# Patient Record
Sex: MALE | Race: WHITE | NOT HISPANIC OR LATINO | URBAN - METROPOLITAN AREA
[De-identification: names, ages, dates, MRNs, and addresses within clinical notes are randomized per-mention and may not be internally consistent; named-entity substitution may affect disease eponyms.]

---

## 2018-12-17 ENCOUNTER — EMERGENCY (EMERGENCY)
Facility: HOSPITAL | Age: 83
LOS: 1 days | Discharge: HOME | End: 2018-12-17
Attending: EMERGENCY MEDICINE
Payer: MEDICARE

## 2018-12-17 VITALS
RESPIRATION RATE: 16 BRPM | SYSTOLIC BLOOD PRESSURE: 158 MMHG | TEMPERATURE: 98 F | HEART RATE: 69 BPM | WEIGHT: 124.56 LBS | OXYGEN SATURATION: 100 % | DIASTOLIC BLOOD PRESSURE: 77 MMHG

## 2018-12-17 LAB
ALBUMIN SERPL ELPH-MCNC: 3.7 G/DL — SIGNIFICANT CHANGE UP (ref 3.3–5.2)
ALP SERPL-CCNC: 58 U/L — SIGNIFICANT CHANGE UP (ref 40–120)
ALT FLD-CCNC: 14 U/L — SIGNIFICANT CHANGE UP
ANION GAP SERPL CALC-SCNC: 15 MMOL/L — SIGNIFICANT CHANGE UP (ref 5–17)
APPEARANCE UR: CLEAR — SIGNIFICANT CHANGE UP
AST SERPL-CCNC: 32 U/L — SIGNIFICANT CHANGE UP
BASOPHILS # BLD AUTO: 0 K/UL — SIGNIFICANT CHANGE UP (ref 0–0.2)
BASOPHILS NFR BLD AUTO: 0.5 % — SIGNIFICANT CHANGE UP (ref 0–2)
BILIRUB SERPL-MCNC: 0.3 MG/DL — LOW (ref 0.4–2)
BILIRUB UR-MCNC: NEGATIVE — SIGNIFICANT CHANGE UP
BUN SERPL-MCNC: 35 MG/DL — HIGH (ref 8–20)
CALCIUM SERPL-MCNC: 8.6 MG/DL — SIGNIFICANT CHANGE UP (ref 8.6–10.2)
CHLORIDE SERPL-SCNC: 103 MMOL/L — SIGNIFICANT CHANGE UP (ref 98–107)
CO2 SERPL-SCNC: 15 MMOL/L — LOW (ref 22–29)
COLOR SPEC: YELLOW — SIGNIFICANT CHANGE UP
CREAT SERPL-MCNC: 1.37 MG/DL — HIGH (ref 0.5–1.3)
DIFF PNL FLD: ABNORMAL
EOSINOPHIL # BLD AUTO: 0.2 K/UL — SIGNIFICANT CHANGE UP (ref 0–0.5)
EOSINOPHIL NFR BLD AUTO: 2.4 % — SIGNIFICANT CHANGE UP (ref 0–5)
EPI CELLS # UR: SIGNIFICANT CHANGE UP
GLUCOSE SERPL-MCNC: 78 MG/DL — SIGNIFICANT CHANGE UP (ref 70–115)
GLUCOSE UR QL: NEGATIVE MG/DL — SIGNIFICANT CHANGE UP
HCT VFR BLD CALC: 43 % — SIGNIFICANT CHANGE UP (ref 42–52)
HGB BLD-MCNC: 13.8 G/DL — LOW (ref 14–18)
KETONES UR-MCNC: NEGATIVE — SIGNIFICANT CHANGE UP
LEUKOCYTE ESTERASE UR-ACNC: ABNORMAL
LYMPHOCYTES # BLD AUTO: 1.2 K/UL — SIGNIFICANT CHANGE UP (ref 1–4.8)
LYMPHOCYTES # BLD AUTO: 18.2 % — LOW (ref 20–55)
MCHC RBC-ENTMCNC: 29.7 PG — SIGNIFICANT CHANGE UP (ref 27–31)
MCHC RBC-ENTMCNC: 32.1 G/DL — SIGNIFICANT CHANGE UP (ref 32–36)
MCV RBC AUTO: 92.5 FL — SIGNIFICANT CHANGE UP (ref 80–94)
MONOCYTES # BLD AUTO: 0.6 K/UL — SIGNIFICANT CHANGE UP (ref 0–0.8)
MONOCYTES NFR BLD AUTO: 8.5 % — SIGNIFICANT CHANGE UP (ref 3–10)
NEUTROPHILS # BLD AUTO: 4.6 K/UL — SIGNIFICANT CHANGE UP (ref 1.8–8)
NEUTROPHILS NFR BLD AUTO: 70.2 % — SIGNIFICANT CHANGE UP (ref 37–73)
NITRITE UR-MCNC: NEGATIVE — SIGNIFICANT CHANGE UP
PH UR: 5 — SIGNIFICANT CHANGE UP (ref 5–8)
PLATELET # BLD AUTO: 167 K/UL — SIGNIFICANT CHANGE UP (ref 150–400)
POTASSIUM SERPL-MCNC: 5.6 MMOL/L — HIGH (ref 3.5–5.3)
POTASSIUM SERPL-SCNC: 5.6 MMOL/L — HIGH (ref 3.5–5.3)
PROT SERPL-MCNC: 7 G/DL — SIGNIFICANT CHANGE UP (ref 6.6–8.7)
PROT UR-MCNC: 30 MG/DL
RBC # BLD: 4.65 M/UL — SIGNIFICANT CHANGE UP (ref 4.6–6.2)
RBC # FLD: 14.3 % — SIGNIFICANT CHANGE UP (ref 11–15.6)
RBC CASTS # UR COMP ASSIST: ABNORMAL /HPF (ref 0–4)
SODIUM SERPL-SCNC: 133 MMOL/L — LOW (ref 135–145)
SP GR SPEC: 1.01 — SIGNIFICANT CHANGE UP (ref 1.01–1.02)
UROBILINOGEN FLD QL: NEGATIVE MG/DL — SIGNIFICANT CHANGE UP
WBC # BLD: 6.6 K/UL — SIGNIFICANT CHANGE UP (ref 4.8–10.8)
WBC # FLD AUTO: 6.6 K/UL — SIGNIFICANT CHANGE UP (ref 4.8–10.8)
WBC UR QL: SIGNIFICANT CHANGE UP

## 2018-12-17 PROCEDURE — 80053 COMPREHEN METABOLIC PANEL: CPT

## 2018-12-17 PROCEDURE — 87186 SC STD MICRODIL/AGAR DIL: CPT

## 2018-12-17 PROCEDURE — 85027 COMPLETE CBC AUTOMATED: CPT

## 2018-12-17 PROCEDURE — 51702 INSERT TEMP BLADDER CATH: CPT

## 2018-12-17 PROCEDURE — 99284 EMERGENCY DEPT VISIT MOD MDM: CPT | Mod: 25

## 2018-12-17 PROCEDURE — 36415 COLL VENOUS BLD VENIPUNCTURE: CPT

## 2018-12-17 PROCEDURE — 87086 URINE CULTURE/COLONY COUNT: CPT

## 2018-12-17 PROCEDURE — 81001 URINALYSIS AUTO W/SCOPE: CPT

## 2018-12-17 NOTE — ED ADULT TRIAGE NOTE - CHIEF COMPLAINT QUOTE
"I haven't been able to urinate since 0900 this morning." Patient A&Ox4 complaining of discomfort to bladder area. Patient straight caths self, "did it at 9am, no urine came out."

## 2018-12-17 NOTE — ED STATDOCS - OBJECTIVE STATEMENT
84 y/o M pt with hx of prostate surgery presents to ED c/o urinary retention. Last urination was 9:00am this morning with a urinary catheter that he has at home to use daily if needed. Pt uses catheter daily. Pt tried to use catheter again last today and nothing came out. He states he feels a fullness in abdomen but denies pain. Pt takes medication after each meal for urinary issues. Pt has hx of UTIs.

## 2018-12-17 NOTE — ED STATDOCS - PROGRESS NOTE DETAILS
Pt. states that he feels better. Leg bag will be placed. Pt. will follow up with a urologist. labs results discussed with patient and his daughters.

## 2018-12-18 ENCOUNTER — EMERGENCY (EMERGENCY)
Facility: HOSPITAL | Age: 83
LOS: 1 days | Discharge: HOME | End: 2018-12-18
Attending: EMERGENCY MEDICINE
Payer: MEDICARE

## 2018-12-18 VITALS
HEIGHT: 72 IN | SYSTOLIC BLOOD PRESSURE: 109 MMHG | HEART RATE: 83 BPM | RESPIRATION RATE: 18 BRPM | TEMPERATURE: 97 F | WEIGHT: 128.09 LBS | OXYGEN SATURATION: 95 % | DIASTOLIC BLOOD PRESSURE: 71 MMHG

## 2018-12-18 LAB
APPEARANCE UR: CLEAR — SIGNIFICANT CHANGE UP
BACTERIA # UR AUTO: ABNORMAL
BILIRUB UR-MCNC: NEGATIVE — SIGNIFICANT CHANGE UP
COLOR SPEC: YELLOW — SIGNIFICANT CHANGE UP
DIFF PNL FLD: ABNORMAL
GLUCOSE UR QL: NEGATIVE MG/DL — SIGNIFICANT CHANGE UP
KETONES UR-MCNC: ABNORMAL
LEUKOCYTE ESTERASE UR-ACNC: ABNORMAL
NITRITE UR-MCNC: NEGATIVE — SIGNIFICANT CHANGE UP
PH UR: 6 — SIGNIFICANT CHANGE UP (ref 5–8)
PROT UR-MCNC: 30 MG/DL
RBC CASTS # UR COMP ASSIST: ABNORMAL /HPF (ref 0–4)
SP GR SPEC: 1.01 — SIGNIFICANT CHANGE UP (ref 1.01–1.02)
UROBILINOGEN FLD QL: NEGATIVE MG/DL — SIGNIFICANT CHANGE UP
WBC UR QL: ABNORMAL

## 2018-12-18 PROCEDURE — 99283 EMERGENCY DEPT VISIT LOW MDM: CPT

## 2018-12-18 PROCEDURE — 87186 SC STD MICRODIL/AGAR DIL: CPT

## 2018-12-18 PROCEDURE — 87086 URINE CULTURE/COLONY COUNT: CPT

## 2018-12-18 PROCEDURE — 81001 URINALYSIS AUTO W/SCOPE: CPT

## 2018-12-18 RX ORDER — CEPHALEXIN 500 MG
1 CAPSULE ORAL
Qty: 28 | Refills: 0 | OUTPATIENT
Start: 2018-12-18 | End: 2018-12-24

## 2018-12-18 RX ORDER — ACETAMINOPHEN 500 MG
650 TABLET ORAL ONCE
Qty: 0 | Refills: 0 | Status: COMPLETED | OUTPATIENT
Start: 2018-12-18 | End: 2018-12-18

## 2018-12-18 RX ORDER — CEPHALEXIN 500 MG
500 CAPSULE ORAL ONCE
Qty: 0 | Refills: 0 | Status: COMPLETED | OUTPATIENT
Start: 2018-12-18 | End: 2018-12-18

## 2018-12-18 RX ADMIN — Medication 650 MILLIGRAM(S): at 12:39

## 2018-12-18 RX ADMIN — Medication 500 MILLIGRAM(S): at 14:12

## 2018-12-18 NOTE — ED STATDOCS - MEDICAL DECISION MAKING DETAILS
bladder is collapsed Osuna appears in place , no drainage  no sign of trauma urethral medius pain likely irration from catheter placement, drainage appears at connection from catheter to leg bag. change leg bag and send UA

## 2018-12-18 NOTE — ED ADULT NURSE NOTE - OBJECTIVE STATEMENT
Pt c/o urinary catheter leaking since last night. PJs were soaking wet and pt states catheter was placed last night here at [facility]. Pt A & Ox4. Pt was unable to urinate yesterday and came  in for urinary retention with urge.

## 2018-12-18 NOTE — ED STATDOCS - OBJECTIVE STATEMENT
86 y/o M pt with hx of enlarged prostate, HTN  presents to ED c/o leakage from urinary catheter and discomfort since last night. Pt states he was seen in ED yesterday; for urinary retention had catheter placed. Per daughter pt self caths daily; able to place  cath properly just no urine output. He notes when he got home his pajamas were wet twice; however emptied bag 3 times. Scheduled Urologist Dr Sanchez appointment in 2 weeks. Denies fevers, abdominal pain. No further complaints at this time.

## 2018-12-18 NOTE — ED STATDOCS - ATTENDING CONTRIBUTION TO CARE
I, Kev Contreras, performed the initial face to face bedside interview with this patient regarding history of present illness, review of symptoms and relevant past medical, social and family history.  I completed an independent physical examination.  I was the initial provider who evaluated this patient. I have signed out the follow up of any pending tests (i.e. labs, radiological studies) to the ACP.  I have communicated the patient’s plan of care and disposition with the ACP.

## 2018-12-18 NOTE — ED STATDOCS - PROGRESS NOTE DETAILS
bedside ultrasound: Bladder collapsed; catheter visualized in bladder. NP NOTE:  HPI, ROS, PE of intake doctor reviewed.  + UTI, cx added, will d/c home with rx hamzah, f/u Dr. Sanchez on 12/24. NP NOTE:  HPI, ROS, PE of intake doctor reviewed.  + UTI, cx added, will change sandra.  will d/c home with rx hamzah, f/u Dr. Sanchez on 12/24. Contreras: pt well appearing, afebrile. Given dose oral; Abx here, stable for outpt mgt and Uro f/u

## 2018-12-18 NOTE — ED ADULT TRIAGE NOTE - CHIEF COMPLAINT QUOTE
pt reports a urinary catheter was placed last night, today presents with c/o pain, leaking and discomfort.

## 2018-12-19 ENCOUNTER — EMERGENCY (EMERGENCY)
Facility: HOSPITAL | Age: 83
LOS: 1 days | Discharge: HOME | End: 2018-12-19
Attending: EMERGENCY MEDICINE
Payer: MEDICARE

## 2018-12-19 VITALS
DIASTOLIC BLOOD PRESSURE: 83 MMHG | RESPIRATION RATE: 17 BRPM | OXYGEN SATURATION: 100 % | HEIGHT: 72 IN | SYSTOLIC BLOOD PRESSURE: 118 MMHG | WEIGHT: 126.99 LBS | TEMPERATURE: 98 F | HEART RATE: 99 BPM

## 2018-12-19 VITALS
SYSTOLIC BLOOD PRESSURE: 125 MMHG | HEART RATE: 98 BPM | DIASTOLIC BLOOD PRESSURE: 82 MMHG | TEMPERATURE: 98 F | RESPIRATION RATE: 17 BRPM | OXYGEN SATURATION: 98 %

## 2018-12-19 DIAGNOSIS — N30.01 ACUTE CYSTITIS WITH HEMATURIA: ICD-10-CM

## 2018-12-19 DIAGNOSIS — R31.0 GROSS HEMATURIA: ICD-10-CM

## 2018-12-19 DIAGNOSIS — N13.30 UNSPECIFIED HYDRONEPHROSIS: ICD-10-CM

## 2018-12-19 DIAGNOSIS — N40.1 BENIGN PROSTATIC HYPERPLASIA WITH LOWER URINARY TRACT SYMPTOMS: ICD-10-CM

## 2018-12-19 LAB
-  AMIKACIN: SIGNIFICANT CHANGE UP
-  AMPICILLIN/SULBACTAM: SIGNIFICANT CHANGE UP
-  AMPICILLIN: SIGNIFICANT CHANGE UP
-  AZTREONAM: SIGNIFICANT CHANGE UP
-  CEFAZOLIN: SIGNIFICANT CHANGE UP
-  CEFEPIME: SIGNIFICANT CHANGE UP
-  CEFOXITIN: SIGNIFICANT CHANGE UP
-  CEFTRIAXONE: SIGNIFICANT CHANGE UP
-  CIPROFLOXACIN: SIGNIFICANT CHANGE UP
-  ERTAPENEM: SIGNIFICANT CHANGE UP
-  GENTAMICIN: SIGNIFICANT CHANGE UP
-  IMIPENEM: SIGNIFICANT CHANGE UP
-  LEVOFLOXACIN: SIGNIFICANT CHANGE UP
-  MEROPENEM: SIGNIFICANT CHANGE UP
-  NITROFURANTOIN: SIGNIFICANT CHANGE UP
-  PIPERACILLIN/TAZOBACTAM: SIGNIFICANT CHANGE UP
-  TIGECYCLINE: SIGNIFICANT CHANGE UP
-  TOBRAMYCIN: SIGNIFICANT CHANGE UP
-  TRIMETHOPRIM/SULFAMETHOXAZOLE: SIGNIFICANT CHANGE UP
ANION GAP SERPL CALC-SCNC: 13 MMOL/L — SIGNIFICANT CHANGE UP (ref 5–17)
APPEARANCE UR: SIGNIFICANT CHANGE UP
APTT BLD: 29.1 SEC — SIGNIFICANT CHANGE UP (ref 27.5–36.3)
BACTERIA # UR AUTO: ABNORMAL
BILIRUB UR-MCNC: NEGATIVE — SIGNIFICANT CHANGE UP
BUN SERPL-MCNC: 32 MG/DL — HIGH (ref 8–20)
CALCIUM SERPL-MCNC: 9 MG/DL — SIGNIFICANT CHANGE UP (ref 8.6–10.2)
CHLORIDE SERPL-SCNC: 104 MMOL/L — SIGNIFICANT CHANGE UP (ref 98–107)
CO2 SERPL-SCNC: 24 MMOL/L — SIGNIFICANT CHANGE UP (ref 22–29)
COLOR SPEC: ABNORMAL
COMMENT - URINE: SIGNIFICANT CHANGE UP
CREAT SERPL-MCNC: 1.36 MG/DL — HIGH (ref 0.5–1.3)
CULTURE RESULTS: SIGNIFICANT CHANGE UP
DIFF PNL FLD: ABNORMAL
EPI CELLS # UR: NEGATIVE — SIGNIFICANT CHANGE UP
GLUCOSE SERPL-MCNC: 75 MG/DL — SIGNIFICANT CHANGE UP (ref 70–115)
GLUCOSE UR QL: NEGATIVE — SIGNIFICANT CHANGE UP
HCT VFR BLD CALC: 44 % — SIGNIFICANT CHANGE UP (ref 42–52)
HGB BLD-MCNC: 14 G/DL — SIGNIFICANT CHANGE UP (ref 14–18)
INR BLD: 1 RATIO — SIGNIFICANT CHANGE UP (ref 0.88–1.16)
KETONES UR-MCNC: ABNORMAL
LEUKOCYTE ESTERASE UR-ACNC: ABNORMAL
MCHC RBC-ENTMCNC: 29.8 PG — SIGNIFICANT CHANGE UP (ref 27–31)
MCHC RBC-ENTMCNC: 31.8 G/DL — LOW (ref 32–36)
MCV RBC AUTO: 93.6 FL — SIGNIFICANT CHANGE UP (ref 80–94)
METHOD TYPE: SIGNIFICANT CHANGE UP
NITRITE UR-MCNC: POSITIVE
ORGANISM # SPEC MICROSCOPIC CNT: SIGNIFICANT CHANGE UP
ORGANISM # SPEC MICROSCOPIC CNT: SIGNIFICANT CHANGE UP
PH UR: 6.5 — SIGNIFICANT CHANGE UP (ref 5–8)
PLATELET # BLD AUTO: 174 K/UL — SIGNIFICANT CHANGE UP (ref 150–400)
POTASSIUM SERPL-MCNC: 4.6 MMOL/L — SIGNIFICANT CHANGE UP (ref 3.5–5.3)
POTASSIUM SERPL-SCNC: 4.6 MMOL/L — SIGNIFICANT CHANGE UP (ref 3.5–5.3)
PROT UR-MCNC: 500 MG/DL
PROTHROM AB SERPL-ACNC: 11.5 SEC — SIGNIFICANT CHANGE UP (ref 10–12.9)
RBC # BLD: 4.7 M/UL — SIGNIFICANT CHANGE UP (ref 4.6–6.2)
RBC # FLD: 14.7 % — SIGNIFICANT CHANGE UP (ref 11–15.6)
RBC CASTS # UR COMP ASSIST: SIGNIFICANT CHANGE UP /HPF (ref 0–4)
SODIUM SERPL-SCNC: 141 MMOL/L — SIGNIFICANT CHANGE UP (ref 135–145)
SP GR SPEC: 1.01 — SIGNIFICANT CHANGE UP (ref 1.01–1.02)
SPECIMEN SOURCE: SIGNIFICANT CHANGE UP
UROBILINOGEN FLD QL: 1
WBC # BLD: 9.5 K/UL — SIGNIFICANT CHANGE UP (ref 4.8–10.8)
WBC # FLD AUTO: 9.5 K/UL — SIGNIFICANT CHANGE UP (ref 4.8–10.8)
WBC UR QL: ABNORMAL

## 2018-12-19 PROCEDURE — 85027 COMPLETE CBC AUTOMATED: CPT

## 2018-12-19 PROCEDURE — 81001 URINALYSIS AUTO W/SCOPE: CPT

## 2018-12-19 PROCEDURE — 96374 THER/PROPH/DIAG INJ IV PUSH: CPT

## 2018-12-19 PROCEDURE — 74176 CT ABD & PELVIS W/O CONTRAST: CPT

## 2018-12-19 PROCEDURE — 87186 SC STD MICRODIL/AGAR DIL: CPT

## 2018-12-19 PROCEDURE — 36415 COLL VENOUS BLD VENIPUNCTURE: CPT

## 2018-12-19 PROCEDURE — 99285 EMERGENCY DEPT VISIT HI MDM: CPT

## 2018-12-19 PROCEDURE — 74176 CT ABD & PELVIS W/O CONTRAST: CPT | Mod: 26

## 2018-12-19 PROCEDURE — 85610 PROTHROMBIN TIME: CPT

## 2018-12-19 PROCEDURE — 87086 URINE CULTURE/COLONY COUNT: CPT

## 2018-12-19 PROCEDURE — 85730 THROMBOPLASTIN TIME PARTIAL: CPT

## 2018-12-19 PROCEDURE — 80048 BASIC METABOLIC PNL TOTAL CA: CPT

## 2018-12-19 PROCEDURE — 99284 EMERGENCY DEPT VISIT MOD MDM: CPT | Mod: 25

## 2018-12-19 RX ORDER — HYDROMORPHONE HYDROCHLORIDE 2 MG/ML
0.5 INJECTION INTRAMUSCULAR; INTRAVENOUS; SUBCUTANEOUS ONCE
Qty: 0 | Refills: 0 | Status: DISCONTINUED | OUTPATIENT
Start: 2018-12-19 | End: 2018-12-19

## 2018-12-19 RX ORDER — CEPHALEXIN 500 MG
500 CAPSULE ORAL
Qty: 0 | Refills: 0 | Status: DISCONTINUED | OUTPATIENT
Start: 2018-12-19 | End: 2018-12-24

## 2018-12-19 RX ADMIN — HYDROMORPHONE HYDROCHLORIDE 0.5 MILLIGRAM(S): 2 INJECTION INTRAMUSCULAR; INTRAVENOUS; SUBCUTANEOUS at 18:46

## 2018-12-19 RX ADMIN — Medication 500 MILLIGRAM(S): at 18:46

## 2018-12-19 NOTE — ED PROVIDER NOTE - ATTENDING CONTRIBUTION TO CARE
The patient seen and examined.  The patient present with hematuria    Hematuria resolved  UTI    I, Franco Weathers, performed the initial face to face bedside interview with this patient regarding history of present illness, review of symptoms and relevant past medical, social and family history.  I completed an independent physical examination.  I was the initial provider who evaluated this patient. I have signed out the follow up of any pending tests (i.e. labs, radiological studies) to the ACP.  I have communicated the patient’s plan of care and disposition with the ACP.

## 2018-12-19 NOTE — ED PROVIDER NOTE - CONSTITUTIONAL, MLM
normal... Well appearing, well nourished, awake, alert, oriented to person, place, time/situation and in no apparent distress. Non-toxic appearing

## 2018-12-19 NOTE — ED PROVIDER NOTE - GENITOURINARY, MLM
Osuna catheter in place draining dark red urine; No discharge, lesions, no redness, atraumatic meatus. No testicular pain or tenderness. No inguinal lymphadenopathy.

## 2018-12-19 NOTE — CONSULT NOTE ADULT - SUBJECTIVE AND OBJECTIVE BOX
86 y/o M presents with gross hematuria. Patient was recently seen at [facility] ED with uti and retention and was sent home with sandra catheter. He states he has had hematuria intermittently but was it was worse today prompting ED visit. Patient has hx of BPH S/P TURP 5 years ago. 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 He has been on keflex for UTI. In ED sandra was changed to 16fr coude and was initially draining bloody urine but is now clear    Vital Signs Last 24 Hrs  T(C): 36.4 (19 Dec 2018 19:36), Max: 36.4 (19 Dec 2018 12:21)  T(F): 97.6 (19 Dec 2018 19:36), Max: 97.6 (19 Dec 2018 12:21)  HR: 98 (19 Dec 2018 19:36) (98 - 99)  BP: 125/82 (19 Dec 2018 19:36) (118/83 - 125/82)  BP(mean): --  RR: 17 (19 Dec 2018 19:36) (17 - 17)  SpO2: 98% (19 Dec 2018 19:36) (98% - 100%)    Gen: NAD  HEENT: NCAT  Abd: Soft  : Circ penis b/l testis palpably normal sandra in place draining akua urine                          14.0   9.5   )-----------( 174      ( 19 Dec 2018 13:41 )             44.0     12-19    141  |  104  |  32.0<H>  ----------------------------<  75  4.6   |  24.0  |  1.36<H>    Ca    9.0      19 Dec 2018 13:41    TPro  7.0  /  Alb  3.7  /  TBili  0.3<L>  /  DBili  x   /  AST  32  /  ALT  14  /  AlkPhos  58  12-17

## 2018-12-19 NOTE — ED STATDOCS - PROGRESS NOTE DETAILS
84 y/o M pt with hx of enlarged prostate, HTN presents to ED on Monday for cath replacement, presents today for another filled cath, majority hematuria, onset of bleeding was Monday night. He states he is unable to urinate with moderate/severe pain associated. Currently he has brownish type urine. Mild tenderness of the abdomen.  Will send to Main ED for further evaluation, blood work, uro consult.

## 2018-12-19 NOTE — ED STATDOCS - NS_ ATTENDINGSCRIBEDETAILS _ED_A_ED_FT
I, Mike Fong, performed the initial face to face bedside interview with this patient regarding history of present illness, review of symptoms and relevant past medical, social and family history.  I completed an independent physical examination.   The history, relevant review of systems, past medical and surgical history, medical decision making, and physical examination was documented by the scribe in my presence and I attest to the accuracy of the documentation.

## 2018-12-19 NOTE — ED PROVIDER NOTE - ENMT, MLM
Airway patent. Moist mucous membranes. Nasal mucosa clear. Mouth with normal mucosa. Throat has no vesicles, no oropharyngeal exudates and uvula is midline.

## 2018-12-19 NOTE — ED PROVIDER NOTE - GASTROINTESTINAL, MLM
Abdomen soft, non-tender, non-distended, no guarding, no rebound tenderness. No suprapubic tenderness. No CVAT.

## 2018-12-19 NOTE — ED PROVIDER NOTE - NEUROLOGICAL, MLM
Alert and oriented, no focal deficits, no motor or sensory deficits. Moves all extremities symmetrically. No focal deficits, no facial droop.

## 2018-12-19 NOTE — ED ADULT TRIAGE NOTE - NS ED TRIAGE AVPU SCALE
right Alert-The patient is alert, awake and responds to voice. The patient is oriented to time, place, and person. The triage nurse is able to obtain subjective information.

## 2018-12-19 NOTE — ED PROVIDER NOTE - OBJECTIVE STATEMENT
86 y/o M with PMHx BPH, prostate surgery, HTN, former smokers presents to ED c/o hematuria in Osuna bag since Tuesday.  Pt was seen in the ED on Monday (12/17) for retention where he had catheter placed. Also presented to ED on Tuesday (12/18) for pain at site and catheter leak, catheter was changed, UTI+, on Keflex.  Pt denies pain currently, took Ibuprofen at 11am this morning.  Has appt with Urology Dr. Sanchez on 12/24 morning.  Pt states he had prostate surgery approx 6 y/o, possibly TURP in [place].  Has not seen urology in [place] yet. Denies LOC, malaise, fever/chills, cough, chest pain, palpitations, SOB, nausea, vomiting, diarrhea. 86 y/o M with PMHx BPH, prostate surgery, HTN, former smokers presents to ED c/o hematuria in Osuna bag since Tuesday.  Pt was seen in the ED on Monday (12/17) for retention where he had catheter placed. Also presented to ED on Tuesday (12/18) for pain at site and catheter leak, catheter was changed, UTI+, on Keflex.  Pt denies pain currently, took Ibuprofen at 11am this morning.  Has appt with Urology Dr. Sanchez on 12/24 morning.  Pt states he had prostate surgery approx 4 y/o, possibly TURP in [place].  Has not seen urology in [place] yet. Denies LOC, malaise, fever/chills, cough, chest pain, palpitations, SOB, nausea, vomiting, diarrhea.  Denies use of blood thinners.

## 2018-12-19 NOTE — ED ADULT NURSE NOTE - OBJECTIVE STATEMENT
Patient is alert and verbal. report hematuria after new placed Osuna. hx: BPH. patient was here on Tuesday and was placed on ABT keflex for UTI

## 2018-12-19 NOTE — ED ADULT NURSE NOTE - INTERVENTIONS DEFINITIONS
Stretcher in lowest position, wheels locked, appropriate side rails in place/Call bell, personal items and telephone within reach

## 2018-12-19 NOTE — ED ADULT TRIAGE NOTE - CHIEF COMPLAINT QUOTE
Pt was here on Monday for Catheter placement, also had it replaced yesterday as it wasn't draining. Pt reports now he has pain and only blood is draining.

## 2018-12-19 NOTE — ED PROVIDER NOTE - PROGRESS NOTE DETAILS
PA NOTE: D/w Dr. Weathers. Called urology, awaiting call back. PA NOTE: Spoke with Dr. Branham on phone, recommended beginning CBI, Dr. Branham will come see pt PA NOTE: Spoke to Dr. Branham, recommends pt to follow up outpatient at office on Monday 12/24, pt urine clearing, significantly less bloody than before.  Pt stable for discharge.  Discussed results, plan and return precautions with pt, verbalized understanding and agreement of plan. Patient will f/u with PMD 1-2 days and Urology on 12/24/18.  Will continue to take home Keflex.

## 2021-01-01 ENCOUNTER — EMERGENCY (EMERGENCY)
Facility: HOSPITAL | Age: 86
LOS: 1 days | End: 2021-01-01
Attending: EMERGENCY MEDICINE
Payer: MEDICARE

## 2021-01-01 LAB
FLUAV AG NPH QL: SIGNIFICANT CHANGE UP
FLUBV AG NPH QL: SIGNIFICANT CHANGE UP
RSV RNA NPH QL NAA+NON-PROBE: SIGNIFICANT CHANGE UP
SARS-COV-2 RNA SPEC QL NAA+PROBE: SIGNIFICANT CHANGE UP

## 2021-01-01 PROCEDURE — 87637 SARSCOV2&INF A&B&RSV AMP PRB: CPT

## 2021-01-01 PROCEDURE — 99291 CRITICAL CARE FIRST HOUR: CPT | Mod: 25

## 2021-01-01 PROCEDURE — 99291 CRITICAL CARE FIRST HOUR: CPT

## 2021-01-01 PROCEDURE — 92950 HEART/LUNG RESUSCITATION CPR: CPT

## 2021-09-08 NOTE — ED PROVIDER NOTE - CLINICAL SUMMARY MEDICAL DECISION MAKING FREE TEXT BOX
Pt is an 88 y.o. M hx of HTN and COPD presenting in cardiac arrest, last known well last night approximately 12 hours ago, found on ground at home. Resuscitation by EMS for 1.5 hours with persistent PEA despite intubation and multiple boluses of epi with IVF. PEA on arrival, attempted resuscitation with one dose of epinephrine and CPR stopped for futility. Pt pronounced by attending physician. Pt is an 88 y.o. M hx of HTN and COPD presenting in cardiac arrest, last known well last night approximately 12 hours ago, found on ground at home. Resuscitation by EMS for 50 minutes with persistent PEA despite intubation and multiple boluses of epi with IVF. PEA on arrival, attempted resuscitation for one round, persistent PEA, no cardiac movement on POCUS, and CPR stopped for futility. Pt pronounced by attending physician.

## 2021-09-08 NOTE — ED ADULT TRIAGE NOTE - CHIEF COMPLAINT QUOTE
unwitnessed cardiac arrest. last seen last night. found in kitchen. pt PEA on arrival. intubated 7inch tube, given epi, glucose, biarb by ems for FS 50. CPR in progress

## 2021-09-08 NOTE — ED PROVIDER NOTE - ATTENDING CONTRIBUTION TO CARE
88 y.o. M hx of HTN and COPD presenting in cardiac arrest, last known well last night approximately 12 hours ago, found on ground at home. Resuscitation by EMS for 50 minutes with persistent PEA despite intubation and multiple boluses of epi with IVF. PEA on arrival, attempted resuscitation for one round, persistent PEA, no cardiac movement on POCUS, and CPR stopped for futility. Pt pronounced by attending physician.

## 2021-09-08 NOTE — ED PROVIDER NOTE - OBJECTIVE STATEMENT
Pt is an 80 y.o. M presenting in cardiac arrest. The pt was noted to be seen in good health yesterday night, time unknown. Today found by family on the ground, no spontaneous respirations. EMS called and intubated patient, resuscitation attempted for 1.5 hours, initial rhythm PEA, multiple boluses of epinephrine given without ROSC achieved. Pt is an 80 y.o. M presenting in cardiac arrest. The pt was noted to be seen in good health yesterday night, time unknown. Today found by family on the ground, no spontaneous respirations. EMS called and intubated patient, resuscitation attempted for 50 minutes, initial rhythm PEA, multiple boluses of epinephrine given without ROSC achieved.

## 2021-09-08 NOTE — ED PROVIDER NOTE - PHYSICAL EXAMINATION
General: unresponsive  Head:  NC, AT  Eyes: EOMI, PERRLA, no scleral icterus  Ears: no erythema/drainage  Nose: midline, no bleeding/drainage  Throat: MMM  Cardiac: PEA on monitor, no lower extremity edema  Respiratory: intubated, CTABL, no wheezes/rales/rhonchi, equal chest wall expansions, no use of accessory muscles, no retractions  Abdomen: soft, nondistended, nontender, no rebound tenderness, no guarding, nonperitonitic  MSK/Vascular: full ROM, soft compartments, warm extremities  Neuro: General: unresponsive  Head:  NC, two 1 cm lacerations to back of the scalp  Eyes: pupils dilated and fixed, EOM not intact, dry conjunctiva  Ears: no erythema/drainage  Chest: sunken chest from CPR  Back: livedo reticularis on back  Nose: midline, no bleeding/drainage  Throat: dry membranes  Cardiac: PEA on monitor, no lower extremity edema  Respiratory: intubated, CTABL, no wheezes/rales/rhonchi,   Abdomen: soft, nondistended  MSK/Vascular: cool and soft extremities  Neuro: unable to assess

## 2021-09-10 PROBLEM — N40.0 BENIGN PROSTATIC HYPERPLASIA WITHOUT LOWER URINARY TRACT SYMPTOMS: Chronic | Status: ACTIVE | Noted: 2018-12-19

## 2021-09-10 PROBLEM — I10 ESSENTIAL (PRIMARY) HYPERTENSION: Chronic | Status: ACTIVE | Noted: 2018-12-19
